# Patient Record
Sex: FEMALE | Race: WHITE | NOT HISPANIC OR LATINO | Employment: FULL TIME | URBAN - METROPOLITAN AREA
[De-identification: names, ages, dates, MRNs, and addresses within clinical notes are randomized per-mention and may not be internally consistent; named-entity substitution may affect disease eponyms.]

---

## 2024-01-05 ENCOUNTER — TRANSFERRED RECORDS (OUTPATIENT)
Dept: HEALTH INFORMATION MANAGEMENT | Facility: CLINIC | Age: 24
End: 2024-01-05

## 2024-01-05 LAB
HBA1C MFR BLD: 5.1 % (ref 4.8–5.6)
PAP-ABSTRACT: ABNORMAL
TSH SERPL-ACNC: 2.73 UIU/ML (ref 0.45–4.5)

## 2024-01-17 ENCOUNTER — TRANSFERRED RECORDS (OUTPATIENT)
Dept: HEALTH INFORMATION MANAGEMENT | Facility: CLINIC | Age: 24
End: 2024-01-17

## 2024-01-20 ENCOUNTER — TRANSFERRED RECORDS (OUTPATIENT)
Dept: MULTI SPECIALTY CLINIC | Facility: CLINIC | Age: 24
End: 2024-01-20

## 2024-01-20 LAB — PAP SMEAR - HIM PATIENT REPORTED: NORMAL

## 2024-06-24 ENCOUNTER — OFFICE VISIT (OUTPATIENT)
Dept: INTERNAL MEDICINE | Facility: CLINIC | Age: 24
End: 2024-06-24
Payer: COMMERCIAL

## 2024-06-24 VITALS
RESPIRATION RATE: 18 BRPM | WEIGHT: 144 LBS | HEIGHT: 62 IN | SYSTOLIC BLOOD PRESSURE: 117 MMHG | DIASTOLIC BLOOD PRESSURE: 78 MMHG | TEMPERATURE: 98 F | HEART RATE: 68 BPM | OXYGEN SATURATION: 99 % | BODY MASS INDEX: 26.5 KG/M2

## 2024-06-24 DIAGNOSIS — G89.29 CHRONIC LEFT-SIDED LOW BACK PAIN WITHOUT SCIATICA: Primary | ICD-10-CM

## 2024-06-24 DIAGNOSIS — M54.50 CHRONIC LEFT-SIDED LOW BACK PAIN WITHOUT SCIATICA: Primary | ICD-10-CM

## 2024-06-24 PROCEDURE — 99203 OFFICE O/P NEW LOW 30 MIN: CPT

## 2024-06-24 RX ORDER — CLASCOTERONE 1 G/100G
CREAM TOPICAL
COMMUNITY

## 2024-06-24 RX ORDER — DOXYCYCLINE HYCLATE 60 MG/1
2 TABLET, DELAYED RELEASE ORAL 2 TIMES DAILY
COMMUNITY

## 2024-06-24 RX ORDER — TRIAMCINOLONE ACETONIDE 1 MG/G
OINTMENT TOPICAL
COMMUNITY

## 2024-06-24 RX ORDER — DEXTROAMPHETAMINE SACCHARATE, AMPHETAMINE ASPARTATE MONOHYDRATE, DEXTROAMPHETAMINE SULFATE AND AMPHETAMINE SULFATE 7.5; 7.5; 7.5; 7.5 MG/1; MG/1; MG/1; MG/1
1 CAPSULE, EXTENDED RELEASE ORAL EVERY MORNING
COMMUNITY

## 2024-06-24 RX ORDER — SULFACETAMIDE SODIUM, SULFUR 98; 48 MG/G; MG/G
LIQUID TOPICAL
COMMUNITY

## 2024-06-24 ASSESSMENT — PAIN SCALES - PAIN ENJOYMENT GENERAL ACTIVITY SCALE (PEG)
INTERFERED_GENERAL_ACTIVITY: 8
AVG_PAIN_PASTWEEK: 5
AVG_PAIN_PASTWEEK: 5
PEG_TOTALSCORE: 5.67
INTERFERED_GENERAL_ACTIVITY: 8
INTERFERED_ENJOYMENT_LIFE: 4
INTERFERED_ENJOYMENT_LIFE: 4
PEG_TOTALSCORE: 5.67

## 2024-06-24 NOTE — PROGRESS NOTES
"  Assessment & Plan     (M54.50,  G89.29) Chronic left-sided low back pain without sciatica  (primary encounter diagnosis)  Comment:   Pt presents for c/o low back pain which began about 3 months ago.  Symptoms are intermittent.  She just graduated college- she used to play club lacrosse. She wonders if she injured herself while lifting weights or while playing lacrosse.  Sitting or bending exacerbates symptoms.  She has stopped exercising over the past few months as this was exacerbating her pain as well.  Denies any bowel or bladders issues, saddle anesthesia, fevers, unintentional weight loss.   Symptoms worsen throughout the day.  No pain to palpation.  Describes symptoms as throbbing and aching.   She occasionally uses Tylenol or ibuprofen to help with symptom management.  I will send referral to physical therapy and she is aware that she should let me know if symptoms do not start to improve at all over the next 48 weeks and I will send referral to orthopedics.  No need for any imaging today.    Plan: Physical Therapy  Referral                  BMI  Estimated body mass index is 26.34 kg/m  as calculated from the following:    Height as of this encounter: 1.575 m (5' 2\").    Weight as of this encounter: 65.3 kg (144 lb).   Weight management plan: Discussed healthy diet and exercise guidelines              Charly Mckeon is a 23 year old, presenting for the following health issues:  Establish Care      Via the Health Maintenance questionnaire, the patient has reported the following services have been completed -Cervical Cancer Screening: [facility] Gynecology 2024-01-20, this information has been sent to the abstraction team.  History of Present Illness       Back Pain:  She presents for follow up of back pain. Patient's back pain is a chronic problem.  Location of back pain:  Left lower back, right side of neck and left side of neck  Description of back pain: gnawing  Back pain spreads: " "nowhere    Since patient first noticed back pain, pain is: always present, but gets better and worse  Does back pain interfere with her job:  Yes       She eats 2-3 servings of fruits and vegetables daily.She consumes 0 sweetened beverage(s) daily.She exercises with enough effort to increase her heart rate 30 to 60 minutes per day.  She exercises with enough effort to increase her heart rate 3 or less days per week.   She is taking medications regularly.                   Objective    /78   Pulse 68   Temp 98  F (36.7  C)   Resp 18   Ht 1.575 m (5' 2\")   Wt 65.3 kg (144 lb)   SpO2 99%   Breastfeeding No   BMI 26.34 kg/m    Body mass index is 26.34 kg/m .  Physical Exam  Constitutional:       General: She is not in acute distress.     Appearance: Normal appearance. She is not ill-appearing, toxic-appearing or diaphoretic.   HENT:      Head: Normocephalic and atraumatic.      Mouth/Throat:      Pharynx: Oropharynx is clear.   Eyes:      Conjunctiva/sclera: Conjunctivae normal.   Pulmonary:      Effort: Pulmonary effort is normal.   Musculoskeletal:      Lumbar back: No swelling, tenderness or bony tenderness.   Skin:     General: Skin is warm and dry.   Neurological:      Mental Status: She is alert and oriented to person, place, and time.   Psychiatric:         Mood and Affect: Mood normal.         Behavior: Behavior normal.         Thought Content: Thought content normal.         Judgment: Judgment normal.                    Signed Electronically by: ALVARO Harden CNP    "

## 2024-06-28 ENCOUNTER — THERAPY VISIT (OUTPATIENT)
Dept: PHYSICAL THERAPY | Facility: CLINIC | Age: 24
End: 2024-06-28
Payer: COMMERCIAL

## 2024-06-28 DIAGNOSIS — G89.29 CHRONIC LEFT-SIDED LOW BACK PAIN WITHOUT SCIATICA: ICD-10-CM

## 2024-06-28 DIAGNOSIS — M54.50 CHRONIC LEFT-SIDED LOW BACK PAIN WITHOUT SCIATICA: ICD-10-CM

## 2024-06-28 PROCEDURE — 97110 THERAPEUTIC EXERCISES: CPT | Mod: GP | Performed by: PHYSICAL THERAPIST

## 2024-06-28 PROCEDURE — 97161 PT EVAL LOW COMPLEX 20 MIN: CPT | Mod: GP | Performed by: PHYSICAL THERAPIST

## 2024-06-28 NOTE — PROGRESS NOTES
PHYSICAL THERAPY EVALUATION  Type of Visit: Evaluation      DOI: 3/1/24  Core power class and lacrosse.  Low back and neck pain-predominantly left side lumbar and upper back both sides  Aggrav: pain with bending, lifting, driving  Allev: avoidance        Subjective       Presenting condition or subjective complaint: Lower back pain on left side and extreme tension in neck and upper back  Date of onset: 03/01/24    Relevant medical history:     Dates & types of surgery: 2015- right ankle growth place surgery    Prior diagnostic imaging/testing results: X-ray     Prior therapy history for the same diagnosis, illness or injury: No          Living Environment  Social support: Alone   Type of home: Apartment/condo   Stairs to enter the home: No       Ramp: No   Stairs inside the home: No       Help at home: None  Equipment owned:       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  [place]  Hobbies/Interests:      Patient goals for therapy: Working out, lifting, sitting for longer periods of time    No shooting pain, parasthesia  No workout classess, walks daily  Pt also reports a recent slip and fall with right RTC sxs that are resolving-saw MD  Lumbar pain is diffuse and at the left lumbar paraspinals-intermittent 5/10 at worst  Thoracic cervical pain is right>left UT and periscap region and occurs with prolonged postures, driving     Objective   Cervical AROM is full, sxs with MARTHA side bending  Obs: pleasant female-flexed sitting posture  TTP: right>left UT, rhomboid-right and lumbar paraspinals left  SLR negative  SI screen clear  MMT; glut max 4/5 MARTHA  Excellent hamstring, gastroc, quad flexibility  Lumbar myotomes 5/5 all levels  Trunk AROM is full with all motions  Pain at endrange flex    L  Assessment & Plan   CLINICAL IMPRESSIONS  Medical Diagnosis: left low back, right neck    Treatment Diagnosis: left low back, right neck   Impression/Assessment: Patient is a 23 year old female with left lumbar  pain/strain, cervical postural strain complaints.  The following significant findings have been identified: Pain, Decreased strength, and Decreased activity tolerance. These impairments interfere with their ability to perform self care tasks, work tasks, and recreational activities as compared to previous level of function.     Clinical Decision Making (Complexity):  Clinical Presentation: Stable/Uncomplicated  Clinical Presentation Rationale: based on medical and personal factors listed in PT evaluation  Clinical Decision Making (Complexity): Low complexity    PLAN OF CARE  Treatment Interventions:  Interventions: Manual Therapy, Neuromuscular Re-education, Therapeutic Activity, Therapeutic Exercise    Long Term Goals     PT Goal 1  Goal Identifier: no pain with bending  Rationale: to maximize safety and independence with performance of ADLs and functional tasks  Target Date: 08/23/24      Frequency of Treatment: 1 time a week  Duration of Treatment: 8 times a week    Recommended Referrals to Other Professionals:  none  Education Assessment:   Learner/Method: Patient    Risks and benefits of evaluation/treatment have been explained.   Patient/Family/caregiver agrees with Plan of Care.     Evaluation Time:     PT Eval, Low Complexity Minutes (50932): 20       Signing Clinician: Samuel Dupree, PT

## 2024-07-19 ENCOUNTER — THERAPY VISIT (OUTPATIENT)
Dept: PHYSICAL THERAPY | Facility: CLINIC | Age: 24
End: 2024-07-19
Payer: COMMERCIAL

## 2024-07-19 DIAGNOSIS — M54.50 LOW BACK PAIN: Primary | ICD-10-CM

## 2024-07-19 PROCEDURE — 97110 THERAPEUTIC EXERCISES: CPT | Mod: GP | Performed by: PHYSICAL THERAPIST

## 2024-07-19 PROCEDURE — 97112 NEUROMUSCULAR REEDUCATION: CPT | Mod: GP | Performed by: PHYSICAL THERAPIST

## 2024-08-06 ENCOUNTER — THERAPY VISIT (OUTPATIENT)
Dept: PHYSICAL THERAPY | Facility: CLINIC | Age: 24
End: 2024-08-06
Payer: COMMERCIAL

## 2024-08-06 DIAGNOSIS — M54.50 LOW BACK PAIN: Primary | ICD-10-CM

## 2024-08-06 PROCEDURE — 97112 NEUROMUSCULAR REEDUCATION: CPT | Mod: GP | Performed by: PHYSICAL THERAPIST

## 2024-08-06 PROCEDURE — 97110 THERAPEUTIC EXERCISES: CPT | Mod: GP | Performed by: PHYSICAL THERAPIST

## 2024-08-15 ENCOUNTER — THERAPY VISIT (OUTPATIENT)
Dept: PHYSICAL THERAPY | Facility: CLINIC | Age: 24
End: 2024-08-15
Payer: COMMERCIAL

## 2024-08-15 DIAGNOSIS — M54.50 LOW BACK PAIN: Primary | ICD-10-CM

## 2024-08-15 PROCEDURE — 97110 THERAPEUTIC EXERCISES: CPT | Mod: GP | Performed by: PHYSICAL THERAPIST

## 2024-08-15 PROCEDURE — 97140 MANUAL THERAPY 1/> REGIONS: CPT | Mod: GP | Performed by: PHYSICAL THERAPIST

## 2024-09-06 ENCOUNTER — THERAPY VISIT (OUTPATIENT)
Dept: PHYSICAL THERAPY | Facility: CLINIC | Age: 24
End: 2024-09-06
Payer: COMMERCIAL

## 2024-09-06 DIAGNOSIS — M54.50 LOW BACK PAIN: Primary | ICD-10-CM

## 2024-09-06 PROCEDURE — 97110 THERAPEUTIC EXERCISES: CPT | Mod: GP | Performed by: PHYSICAL THERAPIST

## 2024-09-06 PROCEDURE — 97140 MANUAL THERAPY 1/> REGIONS: CPT | Mod: GP | Performed by: PHYSICAL THERAPIST

## 2024-09-20 ENCOUNTER — THERAPY VISIT (OUTPATIENT)
Dept: PHYSICAL THERAPY | Facility: CLINIC | Age: 24
End: 2024-09-20
Payer: COMMERCIAL

## 2024-09-20 DIAGNOSIS — M54.50 LOW BACK PAIN: Primary | ICD-10-CM

## 2024-09-20 PROCEDURE — 97140 MANUAL THERAPY 1/> REGIONS: CPT | Mod: GP | Performed by: PHYSICAL THERAPIST

## 2024-09-20 PROCEDURE — 97110 THERAPEUTIC EXERCISES: CPT | Mod: GP | Performed by: PHYSICAL THERAPIST

## 2024-09-20 PROCEDURE — 97112 NEUROMUSCULAR REEDUCATION: CPT | Mod: GP | Performed by: PHYSICAL THERAPIST

## 2024-10-25 ENCOUNTER — THERAPY VISIT (OUTPATIENT)
Dept: PHYSICAL THERAPY | Facility: CLINIC | Age: 24
End: 2024-10-25
Payer: COMMERCIAL

## 2024-10-25 DIAGNOSIS — M54.50 LOW BACK PAIN: Primary | ICD-10-CM

## 2024-10-25 PROCEDURE — 97140 MANUAL THERAPY 1/> REGIONS: CPT | Mod: GP | Performed by: PHYSICAL THERAPIST

## 2024-10-25 PROCEDURE — 97112 NEUROMUSCULAR REEDUCATION: CPT | Mod: GP | Performed by: PHYSICAL THERAPIST

## 2024-11-08 ENCOUNTER — THERAPY VISIT (OUTPATIENT)
Dept: PHYSICAL THERAPY | Facility: CLINIC | Age: 24
End: 2024-11-08
Payer: COMMERCIAL

## 2024-11-08 DIAGNOSIS — M54.50 LOW BACK PAIN: Primary | ICD-10-CM

## 2024-11-08 PROCEDURE — 97140 MANUAL THERAPY 1/> REGIONS: CPT | Mod: GP | Performed by: PHYSICAL THERAPIST

## 2024-11-08 PROCEDURE — 97110 THERAPEUTIC EXERCISES: CPT | Mod: GP | Performed by: PHYSICAL THERAPIST

## 2024-12-13 PROBLEM — M54.50 LOW BACK PAIN: Status: RESOLVED | Noted: 2024-06-28 | Resolved: 2024-12-13

## 2025-01-31 ENCOUNTER — OFFICE VISIT (OUTPATIENT)
Dept: OBGYN | Facility: CLINIC | Age: 25
End: 2025-01-31
Payer: COMMERCIAL

## 2025-01-31 VITALS
SYSTOLIC BLOOD PRESSURE: 113 MMHG | DIASTOLIC BLOOD PRESSURE: 77 MMHG | WEIGHT: 143 LBS | BODY MASS INDEX: 26.16 KG/M2 | HEART RATE: 107 BPM

## 2025-01-31 DIAGNOSIS — Z30.019 ENCOUNTER FOR FEMALE BIRTH CONTROL: Primary | ICD-10-CM

## 2025-01-31 LAB — HCG UR QL: NEGATIVE

## 2025-01-31 PROCEDURE — 81025 URINE PREGNANCY TEST: CPT | Performed by: OBSTETRICS & GYNECOLOGY

## 2025-01-31 PROCEDURE — 99203 OFFICE O/P NEW LOW 30 MIN: CPT | Performed by: OBSTETRICS & GYNECOLOGY

## 2025-01-31 ASSESSMENT — PATIENT HEALTH QUESTIONNAIRE - PHQ9
10. IF YOU CHECKED OFF ANY PROBLEMS, HOW DIFFICULT HAVE THESE PROBLEMS MADE IT FOR YOU TO DO YOUR WORK, TAKE CARE OF THINGS AT HOME, OR GET ALONG WITH OTHER PEOPLE: SOMEWHAT DIFFICULT
SUM OF ALL RESPONSES TO PHQ QUESTIONS 1-9: 1
SUM OF ALL RESPONSES TO PHQ QUESTIONS 1-9: 1

## 2025-01-31 NOTE — PROGRESS NOTES
GYN Progress Note     CC: Birth control counseling     HISTORY OF PRESENT ILLNESS:    Linda Enrique is a 24 year old G0 who presents for contraception counseling. She has been using condoms with her partner but would like something with lower failure/pregnancy rates. She did try OCPs in the past but had mood changes and also felt like it make her adderall less effective and her ADHD symptoms worse. She denies any other GYN concerns. No personal or Fhx of VTE, no personal history of migraine headaches.          OB HISTORY:  OB History   No obstetric history on file.            GYN HISTORY:  She is sexually active with one male partner. Reports having normal pap smear in the past but not sure when that was done and no records available      PAST MEDICAL HISTORY:  Past Medical History:   Diagnosis Date    ADHD (attention deficit hyperactivity disorder)             PAST SURGICAL HISTORY:  No past surgical history on file.       CURRENT MEDICATIONS:   Current Outpatient Medications   Medication Sig Dispense Refill    amphetamine-dextroamphetamine (ADDERALL XR) 30 MG 24 hr capsule Take 1 capsule by mouth every morning              ALLERGIES:  Penicillin g         SOCIAL HISTORY:  Social History     Tobacco Use    Smoking status: Never     Passive exposure: Never    Smokeless tobacco: Never   Vaping Use    Vaping status: Never Used            FAMILY HISTORY:  No family history on file.         REVIEW OF SYSTEMS:  See HPI      PHYSICAL EXAMINATION:  VS:/77 (BP Location: Left arm, Patient Position: Sitting, Cuff Size: Adult Regular)   Pulse 107   Wt 64.9 kg (143 lb)   BMI 26.16 kg/m    Body mass index is 26.16 kg/m .    General: Patient alert and oriented, no acute distress  CV: no peripheral edema or cyanosis  Resp: normal respiratory effort and equal lung expansion  Ext: non-tender, no edema          Laboratory values:  Imaging findings:        ASSESSMENT:  Linda Enrique is a 24 year old G0 who presents for  counseling regarding contraception options       PLAN:  (Z30.019) Encounter for female birth control  (primary encounter diagnosis)  The patient and I discussed various contraceptive methods which best meet her personal family planning goals and fit into her lifestyle with the lowest possible risk of complications or side effects. We discussed multiple contraceptive options including OCPs, NuvaRing, Depo Provera, Nexplanon, and both the hormonal and non-hormonal IUDs.     Risks and benefits of each method were discussed in detail. We specifically discussed the increased risk of blood clot/stroke with estrogen containing medications. Questions were answered to the patient's satisfaction and at this time she has opted to proceed with using the IUD (likely Mirena)  for contraception. She does not want to have this placed today but will continue to use condoms consistently until her next visit.     Discussed pain management options, she will take Ibuprofen 600 mg prior to placement and we can also do a cervical block with 1% lidocaine. Plan for GC/CT at the time of IUD insertion along with pap smear given that we do not have any records at this time.     Plan: HCG qualitative urine    Dispo: RTC for IUD insertion     Michelle Wiley MD

## 2025-02-03 ENCOUNTER — TELEPHONE (OUTPATIENT)
Dept: OBGYN | Facility: CLINIC | Age: 25
End: 2025-02-03
Payer: COMMERCIAL

## 2025-02-03 NOTE — TELEPHONE ENCOUNTER
Health Call Center    Phone Message    May a detailed message be left on voicemail: yes     Reason for Call: Other: Patient called stating she was told to call a few days before her IUD Insertion to discuss pain management options. Patient stated she was thinking of the numbing medication but had some questions about it being either pill form or if it would be a shot. Patient also asking if someone can contact her to just go over the options one more time. Please contact patient in regards to this message. Thank you     Action Taken: Other: Women's    Travel Screening: Not Applicable     Date of Service:

## 2025-02-03 NOTE — TELEPHONE ENCOUNTER
2/7/25 - IUD appointment    Patient is feeling very nervous about IUD placement and wanting to review options again for pain management.    Discussed oxycodone for pain, ativan for anxiety/relaxation.  Patient states these make her nervous to take and would rather not take these. Will call back if she changes her mind.    Patient feels most comfortable with cervical block and ibuprofen.  Discussed use of ibuprofen prior to appointment.    Patient also still undecided about Mirena vs Kyleena.  Discussed they are very similar - major difference is going to be size and how long they last for.    Advised she can always discuss once again prior to placement to feel most comfortable with her decision.    Maria Edwards, RN

## 2025-02-07 ENCOUNTER — OFFICE VISIT (OUTPATIENT)
Dept: OBGYN | Facility: CLINIC | Age: 25
End: 2025-02-07
Payer: COMMERCIAL

## 2025-02-07 VITALS
OXYGEN SATURATION: 100 % | SYSTOLIC BLOOD PRESSURE: 125 MMHG | WEIGHT: 140.9 LBS | TEMPERATURE: 98 F | HEART RATE: 112 BPM | BODY MASS INDEX: 25.77 KG/M2 | DIASTOLIC BLOOD PRESSURE: 83 MMHG

## 2025-02-07 DIAGNOSIS — Z12.4 CERVICAL CANCER SCREENING: ICD-10-CM

## 2025-02-07 DIAGNOSIS — Z30.430 ENCOUNTER FOR IUD INSERTION: Primary | ICD-10-CM

## 2025-02-07 DIAGNOSIS — Z11.3 ROUTINE SCREENING FOR STI (SEXUALLY TRANSMITTED INFECTION): ICD-10-CM

## 2025-02-07 LAB — HCG UR QL: NEGATIVE

## 2025-02-07 PROCEDURE — 87491 CHLMYD TRACH DNA AMP PROBE: CPT | Performed by: OBSTETRICS & GYNECOLOGY

## 2025-02-07 PROCEDURE — 58300 INSERT INTRAUTERINE DEVICE: CPT | Performed by: OBSTETRICS & GYNECOLOGY

## 2025-02-07 PROCEDURE — 81025 URINE PREGNANCY TEST: CPT | Performed by: OBSTETRICS & GYNECOLOGY

## 2025-02-07 PROCEDURE — 87591 N.GONORRHOEAE DNA AMP PROB: CPT | Performed by: OBSTETRICS & GYNECOLOGY

## 2025-02-07 ASSESSMENT — ANXIETY QUESTIONNAIRES
6. BECOMING EASILY ANNOYED OR IRRITABLE: NOT AT ALL
1. FEELING NERVOUS, ANXIOUS, OR ON EDGE: MORE THAN HALF THE DAYS
5. BEING SO RESTLESS THAT IT IS HARD TO SIT STILL: NOT AT ALL
GAD7 TOTAL SCORE: 8
IF YOU CHECKED OFF ANY PROBLEMS ON THIS QUESTIONNAIRE, HOW DIFFICULT HAVE THESE PROBLEMS MADE IT FOR YOU TO DO YOUR WORK, TAKE CARE OF THINGS AT HOME, OR GET ALONG WITH OTHER PEOPLE: SOMEWHAT DIFFICULT
GAD7 TOTAL SCORE: 8
2. NOT BEING ABLE TO STOP OR CONTROL WORRYING: MORE THAN HALF THE DAYS
7. FEELING AFRAID AS IF SOMETHING AWFUL MIGHT HAPPEN: NOT AT ALL
3. WORRYING TOO MUCH ABOUT DIFFERENT THINGS: MORE THAN HALF THE DAYS

## 2025-02-07 ASSESSMENT — PATIENT HEALTH QUESTIONNAIRE - PHQ9
5. POOR APPETITE OR OVEREATING: MORE THAN HALF THE DAYS
SUM OF ALL RESPONSES TO PHQ QUESTIONS 1-9: 3

## 2025-02-07 NOTE — PATIENT INSTRUCTIONS
After having an IUD placed it is normal to have spotting and cramping, you can take Ibuprofen or Tylenol according to the instructions on the bottle and use a heating pad to the lower abdomen as needed.     Please avoid placing anything in the vagina (tampons, intercourse, etc) for 72 hours. The progesterone IUD takes 7 days to be effective for pregnancy prevention so please use condoms for back up contraception if you have intercourse before the 7 day brian but the copper (ParaGard IUD) is effective the same day of placement.     Please call [phone] with any severe abdominal pain, heavy vaginal bleeding, fevers or foul smelling vaginal discharge.     You should check your IUD strings in 2 weeks by placing one or two fingers inside the vagina as high up as you can reach, you should be able to feel the IUD strings (which feel like fishing line), if you can't feel your strings or don't feel comfortable checking yourself you can call clinic to schedule an IUD check.

## 2025-02-08 LAB
C TRACH DNA SPEC QL NAA+PROBE: NEGATIVE
N GONORRHOEA DNA SPEC QL NAA+PROBE: NEGATIVE
SPECIMEN TYPE: NORMAL
SPECIMEN TYPE: NORMAL

## 2025-02-12 LAB
BKR LAB AP GYN ADEQUACY: NORMAL
BKR LAB AP GYN INTERPRETATION: NORMAL
BKR LAB AP HPV REFLEX: NO
BKR LAB AP LMP: NORMAL
BKR LAB AP PREVIOUS ABNORMAL: NORMAL
PATH REPORT.COMMENTS IMP SPEC: NORMAL
PATH REPORT.COMMENTS IMP SPEC: NORMAL
PATH REPORT.RELEVANT HX SPEC: NORMAL

## 2025-02-13 PROBLEM — R87.612 PAPANICOLAOU SMEAR OF CERVIX WITH LOW GRADE SQUAMOUS INTRAEPITHELIAL LESION (LGSIL): Status: ACTIVE | Noted: 2023-12-20

## 2025-02-16 ENCOUNTER — HEALTH MAINTENANCE LETTER (OUTPATIENT)
Age: 25
End: 2025-02-16

## 2025-03-03 ENCOUNTER — OFFICE VISIT (OUTPATIENT)
Dept: DERMATOLOGY | Facility: CLINIC | Age: 25
End: 2025-03-03
Payer: COMMERCIAL

## 2025-03-03 DIAGNOSIS — L91.8 INFLAMED ACROCHORDON: ICD-10-CM

## 2025-03-03 DIAGNOSIS — L70.0 ACNE VULGARIS: Primary | ICD-10-CM

## 2025-03-03 RX ORDER — SPIRONOLACTONE 100 MG/1
TABLET, FILM COATED ORAL
Qty: 90 TABLET | Refills: 1 | Status: SHIPPED | OUTPATIENT
Start: 2025-03-03 | End: 2025-03-06

## 2025-03-03 RX ORDER — SULFACETAMIDE SODIUM, SULFUR 98; 48 MG/G; MG/G
LIQUID TOPICAL DAILY
COMMUNITY

## 2025-03-03 RX ORDER — TRETINOIN 0.25 MG/G
CREAM TOPICAL
Qty: 45 G | Refills: 3 | Status: SHIPPED | OUTPATIENT
Start: 2025-03-03

## 2025-03-03 RX ORDER — TRIFAROTENE 50 UG/G
CREAM TOPICAL AT BEDTIME
COMMUNITY

## 2025-03-03 RX ORDER — DOXYCYCLINE HYCLATE 60 MG/1
2 TABLET, DELAYED RELEASE ORAL 2 TIMES DAILY PRN
COMMUNITY

## 2025-03-03 ASSESSMENT — PAIN SCALES - GENERAL: PAINLEVEL_OUTOF10: NO PAIN (0)

## 2025-03-03 NOTE — LETTER
3/3/2025      Linda Enrique  [address]  [place] [address]      Dear Colleague,    Thank you for referring your patient, Linda Enrique, to the [facility]. Please see a copy of my visit note below.    [facility] Dermatology Note  Encounter Date: Mar 3, 2025  Office Visit     Reviewed patients past medical history and pertinent chart review prior to patients visit today.     Dermatology Problem List:  # Acne vulgaris  -Spironolactone 100 mg daily, tretinoin 0.025% cream, sodium sulfacetamide and sulfur cleanser  # Inflamed acrochordon  -s/p cryo  ____________________________________________    Assessment & Plan:     # Acne vulgaris  We reviewed the etiology, pathogenesis, evolutionary course and treatment alternatives.   At this time our plan is as follows:    Morning:  -Wash face with her her sodium sulfacetamide and sulfur cleanser and lukewarm water.  -Moisturize with a gentle facial moisturizer for sensitive skin such as Cetaphil, CeraVe or Vanicream. Apply spf of 30 or more to entire face.    Night:  -Wash face with a gentle soap and lukewarm water.   -Apply pea sized about of tretinoin 0.025% cream to face starting every other night, increasing to nightly as tolerated.   -Moisturize with a gentle facial moisturizer.    In addition:  - Spironolactone 100 mg daily was prescribed today. Patient was instructed to initially take 50 mg daily for 1-2 weeks or until tolerated to help minimize side effects.  Risks and side effects including but not limited to hyperkalemia, orthostatic hypotension, nausea/vomiting, diarrhea, fatigue, dizziness, headaches, menstrual irregularities, breast tenderness, and feminization of a male fetus were reviewed. The patient confirmed she is not pregnant or planing pregnancy.  -Topical treatments should be applied to the entire face and are not indicated for spot treatment.  -Patient should not get pregnant while on  the above medications.  -If serious side effects develop, patient should stop the medication(s) and contact prescribing provider.    Follow-up: 3 months for follow up if not well controlled. 1 year if well controlled on topical therapy only, sooner PRN new concerns    # Inflamed acrochordon x1, left lower eyelid  -The benign nature of the skin lesion(s) was discussed with the patient.  Due to the inflamed and irritated nature of the lesions, treatment is recommended and the patient was agreeable.    -Cryotherapy procedure note, location(s): left lower eyelid. After verbal consent and discussion of risks and benefits including, but not limited to, dyspigmentation/scar, blister, and pain, the lesion(s) was(were) treated with 1-2 mm freeze border for 1-2 cycles with liquid nitrogen. Post cryotherapy instructions were provided.    All risks, benefits and alternatives were discussed with patient.  Patient is in agreement and understands the assessment and plan.  All questions were answered.  Malorie Ray PA-C  [facility] Dermatology  _______________________________________    CC: Acne    HPI:  Ms. Linda Enrique is a(n) 24 year old female who presents today as a new patient for acne. Patient has acne on the face and sometimes on the chest, which has been present since she was 13-14 years old. Patient has tried topicals, OCPs, oral antibiotics such as doxycycline, and accutane for treatment in the past. She was on accutane roughly 5 years ago. She states her acne did not completely clear, and returned 6 months after stopping it.  She tends to break out along her chin, cheeks, and jawline. She is currently on treatment with Aklief, sodium sulfacetamide and sulfur cleanser, and takes oral doxycycline prn during flares. Her Aklief is drying. She also uses Vanicream cleanser/moisturizer.  She has an IUD Kyleena which she had placed 1 month ago.    Patient is otherwise feeling well, without additional skin  concerns.    Physical Exam:  SKIN: Focused examination of the face only was performed per patient request.  - few closed comedones scattered on the forehead  -scattered inflammatory papules and pustules on jawline, cheeks, and chin region  -minimal scattered postinflammatory hyperpigmentation on the chin and jawline  -left lower eyelid, soft, skin colored to brown pedunculated papule    - No other lesions of concern on areas examined.     Medications:  Current Outpatient Medications   Medication Sig Dispense Refill     amphetamine-dextroamphetamine (ADDERALL XR) 30 MG 24 hr capsule Take 1 capsule by mouth every morning       Doxycycline Hyclate (DORYX MPC) 60 MG TBEC Take 2 tablets by mouth 2 times daily as needed.       Sulfacetamide Sodium-Sulfur 9.8-4.8 % LIQD Externally apply topically daily.       Trifarotene (AKLIEF) 0.005 % CREA at bedtime.       Current Facility-Administered Medications   Medication Dose Route Frequency Provider Last Rate Last Admin     levonorgestrel (KYLEENA) 19.5 MG IUD 1 each  1 each Intrauterine Once           Past Medical History:   Patient Active Problem List   Diagnosis     Papanicolaou smear of cervix with low grade squamous intraepithelial lesion (LGSIL)     Past Medical History:   Diagnosis Date     ADHD (attention deficit hyperactivity disorder)        CC Referred Self, MD  No address on file on close of this encounter.       Again, thank you for allowing me to participate in the care of your patient.        Sincerely,        Kim Ray PA-C    Electronically signed

## 2025-03-03 NOTE — PROGRESS NOTES
[facility] Dermatology Note  Encounter Date: Mar 3, 2025  Office Visit     Reviewed patients past medical history and pertinent chart review prior to patients visit today.     Dermatology Problem List:  # Acne vulgaris  -Spironolactone 100 mg daily, tretinoin 0.025% cream, sodium sulfacetamide and sulfur cleanser  # Inflamed acrochordon  -s/p cryo  ____________________________________________    Assessment & Plan:     # Acne vulgaris  We reviewed the etiology, pathogenesis, evolutionary course and treatment alternatives.   At this time our plan is as follows:    Morning:  -Wash face with her her sodium sulfacetamide and sulfur cleanser and lukewarm water.  -Moisturize with a gentle facial moisturizer for sensitive skin such as Cetaphil, CeraVe or Vanicream. Apply spf of 30 or more to entire face.    Night:  -Wash face with a gentle soap and lukewarm water.   -Apply pea sized about of tretinoin 0.025% cream to face starting every other night, increasing to nightly as tolerated.   -Moisturize with a gentle facial moisturizer.    In addition:  - Spironolactone 100 mg daily was prescribed today. Patient was instructed to initially take 50 mg daily for 1-2 weeks or until tolerated to help minimize side effects.  Risks and side effects including but not limited to hyperkalemia, orthostatic hypotension, nausea/vomiting, diarrhea, fatigue, dizziness, headaches, menstrual irregularities, breast tenderness, and feminization of a male fetus were reviewed. The patient confirmed she is not pregnant or planing pregnancy.  -Topical treatments should be applied to the entire face and are not indicated for spot treatment.  -Patient should not get pregnant while on the above medications.  -If serious side effects develop, patient should stop the medication(s) and contact prescribing provider.    Follow-up: 3 months for follow up if not well controlled. 1 year if well controlled on topical therapy only, sooner PRN  new concerns    # Inflamed acrochordon x1, left lower eyelid  -The benign nature of the skin lesion(s) was discussed with the patient.  Due to the inflamed and irritated nature of the lesions, treatment is recommended and the patient was agreeable.    -Cryotherapy procedure note, location(s): left lower eyelid. After verbal consent and discussion of risks and benefits including, but not limited to, dyspigmentation/scar, blister, and pain, the lesion(s) was(were) treated with 1-2 mm freeze border for 1-2 cycles with liquid nitrogen. Post cryotherapy instructions were provided.    All risks, benefits and alternatives were discussed with patient.  Patient is in agreement and understands the assessment and plan.  All questions were answered.  Malorie Ray PA-C  [facility] Dermatology  _______________________________________    CC: Acne    HPI:  Ms. Linda Enrique is a(n) 24 year old female who presents today as a new patient for acne. Patient has acne on the face and sometimes on the chest, which has been present since she was 13-14 years old. Patient has tried topicals, OCPs, oral antibiotics such as doxycycline, and accutane for treatment in the past. She was on accutane roughly 5 years ago. She states her acne did not completely clear, and returned 6 months after stopping it.  She tends to break out along her chin, cheeks, and jawline. She is currently on treatment with Aklief, sodium sulfacetamide and sulfur cleanser, and takes oral doxycycline prn during flares. Her Aklief is drying. She also uses Vanicream cleanser/moisturizer.  She has an IUD Kyleena which she had placed 1 month ago.    Patient is otherwise feeling well, without additional skin concerns.    Physical Exam:  SKIN: Focused examination of the face only was performed per patient request.  - few closed comedones scattered on the forehead  -scattered inflammatory papules and pustules on jawline, cheeks, and chin region  -minimal scattered  postinflammatory hyperpigmentation on the chin and jawline  -left lower eyelid, soft, skin colored to brown pedunculated papule    - No other lesions of concern on areas examined.     Medications:  Current Outpatient Medications   Medication Sig Dispense Refill    amphetamine-dextroamphetamine (ADDERALL XR) 30 MG 24 hr capsule Take 1 capsule by mouth every morning      Doxycycline Hyclate (DORYX MPC) 60 MG TBEC Take 2 tablets by mouth 2 times daily as needed.      Sulfacetamide Sodium-Sulfur 9.8-4.8 % LIQD Externally apply topically daily.      Trifarotene (AKLIEF) 0.005 % CREA at bedtime.       Current Facility-Administered Medications   Medication Dose Route Frequency Provider Last Rate Last Admin    levonorgestrel (KYLEENA) 19.5 MG IUD 1 each  1 each Intrauterine Once           Past Medical History:   Patient Active Problem List   Diagnosis    Papanicolaou smear of cervix with low grade squamous intraepithelial lesion (LGSIL)     Past Medical History:   Diagnosis Date    ADHD (attention deficit hyperactivity disorder)        CC Referred Self, MD  No address on file on close of this encounter.

## 2025-03-03 NOTE — PATIENT INSTRUCTIONS
Acne plan:  Morning:  -Wash face with her her sodium sulfacetamide and sulfur cleanser and lukewarm water.  -Moisturize with a gentle facial moisturizer for sensitive skin such as Cetaphil, CeraVe or Vanicream. Apply spf of 30 or more to entire face.     Night:  -Wash face with a gentle soap and lukewarm water.   -Apply pea sized about of tretinoin 0.025% cream to face starting every other night, increasing to nightly as tolerated.   -Moisturize with a gentle facial moisturizer.     In addition:  - Spironolactone 100 mg daily was prescribed today. Patient was instructed to initially take 50 mg daily for 1-2 weeks or until tolerated to help minimize side effects.  Risks and side effects including but not limited to hyperkalemia, orthostatic hypotension, nausea/vomiting, diarrhea, fatigue, dizziness, headaches, menstrual irregularities, breast tenderness, and feminization of a male fetus were reviewed. The patient confirmed she is not pregnant or planing pregnancy.  -Topical treatments should be applied to the entire face and are not indicated for spot treatment.  -Patient should not get pregnant while on the above medications.  -If serious side effects develop, patient should stop the medication(s) and contact prescribing provider.      Cryotherapy    What is it?  Use of a very cold liquid, such as liquid nitrogen, to freeze and destroy abnormal skin cells that need to be removed    What should I expect?  Tenderness and redness  A small blister that might grow and fill with dark purple blood. There may be crusting.  More than one treatment may be needed if the lesions do not go away.    How do I care for the treated area?  Gently wash the area with your hands when bathing.  Use a thin layer of Vaseline to help with healing. You may use a Band-Aid.   The area should heal within 7-10 days and may leave behind a pink or lighter color.   Do not use an antibiotic or Neosporin ointment.   You may take acetaminophen  (Tylenol) for pain.     Call your doctor if you have:  Severe pain  Signs of infection (warmth, redness, cloudy yellow drainage, and or a bad smell)  Questions or concerns    Who should I call with questions?      [facility]: [phone]      [facility]: [phone]      For urgent needs outside of business hours call the [facility] at [phone] and ask for the dermatology resident on call

## 2025-03-06 RX ORDER — SPIRONOLACTONE 100 MG/1
TABLET, FILM COATED ORAL
Qty: 90 TABLET | Refills: 1 | Status: SHIPPED | OUTPATIENT
Start: 2025-03-06

## 2025-03-23 ENCOUNTER — OFFICE VISIT (OUTPATIENT)
Dept: URGENT CARE | Facility: URGENT CARE | Age: 25
End: 2025-03-23
Payer: COMMERCIAL

## 2025-03-23 VITALS
HEART RATE: 82 BPM | DIASTOLIC BLOOD PRESSURE: 63 MMHG | OXYGEN SATURATION: 98 % | BODY MASS INDEX: 25.21 KG/M2 | HEIGHT: 62 IN | TEMPERATURE: 97.4 F | SYSTOLIC BLOOD PRESSURE: 98 MMHG | WEIGHT: 137 LBS | RESPIRATION RATE: 16 BRPM

## 2025-03-23 DIAGNOSIS — R21 RASH AND NONSPECIFIC SKIN ERUPTION: Primary | ICD-10-CM

## 2025-03-23 LAB
BASOPHILS # BLD AUTO: 0 10E3/UL (ref 0–0.2)
BASOPHILS NFR BLD AUTO: 0 %
DEPRECATED S PYO AG THROAT QL EIA: NEGATIVE
EOSINOPHIL # BLD AUTO: 0 10E3/UL (ref 0–0.7)
EOSINOPHIL NFR BLD AUTO: 0 %
ERYTHROCYTE [DISTWIDTH] IN BLOOD BY AUTOMATED COUNT: 11.8 % (ref 10–15)
HCT VFR BLD AUTO: 38.4 % (ref 35–47)
HGB BLD-MCNC: 12.7 G/DL (ref 11.7–15.7)
IMM GRANULOCYTES # BLD: 0 10E3/UL
IMM GRANULOCYTES NFR BLD: 0 %
LYMPHOCYTES # BLD AUTO: 0.3 10E3/UL (ref 0.8–5.3)
LYMPHOCYTES NFR BLD AUTO: 4 %
MCH RBC QN AUTO: 28.7 PG (ref 26.5–33)
MCHC RBC AUTO-ENTMCNC: 33.1 G/DL (ref 31.5–36.5)
MCV RBC AUTO: 87 FL (ref 78–100)
MONOCYTES # BLD AUTO: 0.1 10E3/UL (ref 0–1.3)
MONOCYTES NFR BLD AUTO: 2 %
NEUTROPHILS # BLD AUTO: 6.8 10E3/UL (ref 1.6–8.3)
NEUTROPHILS NFR BLD AUTO: 94 %
PLATELET # BLD AUTO: 231 10E3/UL (ref 150–450)
RBC # BLD AUTO: 4.42 10E6/UL (ref 3.8–5.2)
WBC # BLD AUTO: 7.2 10E3/UL (ref 4–11)

## 2025-03-23 PROCEDURE — 36415 COLL VENOUS BLD VENIPUNCTURE: CPT | Performed by: FAMILY MEDICINE

## 2025-03-23 PROCEDURE — 99203 OFFICE O/P NEW LOW 30 MIN: CPT | Performed by: FAMILY MEDICINE

## 2025-03-23 PROCEDURE — 87651 STREP A DNA AMP PROBE: CPT | Performed by: FAMILY MEDICINE

## 2025-03-23 PROCEDURE — 85025 COMPLETE CBC W/AUTO DIFF WBC: CPT | Performed by: FAMILY MEDICINE

## 2025-03-23 PROCEDURE — 3078F DIAST BP <80 MM HG: CPT | Performed by: FAMILY MEDICINE

## 2025-03-23 PROCEDURE — 3074F SYST BP LT 130 MM HG: CPT | Performed by: FAMILY MEDICINE

## 2025-03-23 RX ORDER — FLUTICASONE PROPIONATE 50 MCG
1 SPRAY, SUSPENSION (ML) NASAL
COMMUNITY
End: 2025-03-23

## 2025-03-23 RX ORDER — PREDNISONE 20 MG/1
20 TABLET ORAL DAILY
COMMUNITY
Start: 2025-03-23

## 2025-03-23 RX ORDER — HYDROXYZINE HYDROCHLORIDE 25 MG/1
TABLET, FILM COATED ORAL
COMMUNITY

## 2025-03-23 ASSESSMENT — ENCOUNTER SYMPTOMS: FEVER: 0

## 2025-03-24 ENCOUNTER — OFFICE VISIT (OUTPATIENT)
Dept: URGENT CARE | Facility: URGENT CARE | Age: 25
End: 2025-03-24
Payer: COMMERCIAL

## 2025-03-24 VITALS
DIASTOLIC BLOOD PRESSURE: 58 MMHG | TEMPERATURE: 98.4 F | WEIGHT: 138 LBS | BODY MASS INDEX: 25.24 KG/M2 | HEART RATE: 81 BPM | SYSTOLIC BLOOD PRESSURE: 89 MMHG | OXYGEN SATURATION: 100 %

## 2025-03-24 DIAGNOSIS — Z20.818 STREPTOCOCCUS EXPOSURE: Primary | ICD-10-CM

## 2025-03-24 LAB — S PYO DNA THROAT QL NAA+PROBE: NOT DETECTED

## 2025-03-24 PROCEDURE — 3078F DIAST BP <80 MM HG: CPT | Performed by: PHYSICIAN ASSISTANT

## 2025-03-24 PROCEDURE — 3074F SYST BP LT 130 MM HG: CPT | Performed by: PHYSICIAN ASSISTANT

## 2025-03-24 PROCEDURE — 99213 OFFICE O/P EST LOW 20 MIN: CPT | Performed by: PHYSICIAN ASSISTANT

## 2025-03-24 RX ORDER — AMOXICILLIN 500 MG/1
500 CAPSULE ORAL 2 TIMES DAILY
Qty: 20 CAPSULE | Refills: 0 | Status: SHIPPED | OUTPATIENT
Start: 2025-03-24 | End: 2025-04-03

## 2025-03-24 NOTE — PROGRESS NOTES
Assessment & Plan     Rash and nonspecific skin eruption  Likely a viral exanthem, recent had some URI symptoms and now presenting with a diffuse rash.  It is blanchable, do not suspect vasculitis.  With throat symptoms, I did examine the oropharynx, negative strep, no imminent airway compromise.  CBC do not show any major systemic infectious process.  At this time patient will purchase over-the-counter Zyrtec 10 mg take twice daily, reviewed precautions for going to the ER including symptoms of throat swelling, difficulty breathing, at that time recommend calling 911 or going to the ED.  Otherwise, okay to continue prednisone.  May need dermatology consultation in future if not improving.  - Streptococcus A Rapid Screen w/Reflex to PCR - Clinic Collect  - CBC with platelets and differential  - CBC with platelets and differential  - Group A Streptococcus PCR Throat Swab             Return in about 1 week (around 3/30/2025) for If symptoms do not improve or gets worse..    Fredrick Vinson MD  [facility] [place]    Charly Mckeon is a 24 year old female who presents to clinic today for the following health issues:  Chief Complaint   Patient presents with    Derm Problem     Rash on entire body, noticed this AM---had virtual visit this AM and was prescribed Prednisone         3/23/2025     7:08 PM   Additional Questions   Roomed by Pepper Perez     HPI    Patient is a pleasant 24-year-old female who presents urgent care with entire body rash, started this morning, mostly start on the right upper thigh and now has spread throughout her entire body and onto her hands and legs.  Very itchy, did a telehealth visit today was prescribed prednisone and she believes symptoms of gotten worse.  She is getting some tingling sensations in her lips.  Having some throat discomfort, no difficulty swallowing or breathing.  Preceding this episode she had a week of upper respiratory infectious symptoms,  "those have resolved and now she has a rash.    Denies abdominal pain, diarrhea, lower urinary tract symptoms including painful urination increased urinary frequency.  No vaginal discharge.    No new pets, recent travel, change of medication, soaps, laundry detergents, injury or insect bite.          Review of Systems   Constitutional:  Negative for fever.           Objective    BP 98/63 (BP Location: Right arm, Patient Position: Chair, Cuff Size: Adult Regular)   Pulse 82   Temp 97.4  F (36.3  C) (Temporal)   Resp 16   Ht 1.575 m (5' 2\")   Wt 62.1 kg (137 lb)   LMP 02/03/2025   SpO2 98%   BMI 25.06 kg/m    Physical Exam  Constitutional:       Appearance: Normal appearance. She is not ill-appearing.   HENT:      Mouth/Throat:      Pharynx: No oropharyngeal exudate or posterior oropharyngeal erythema.      Comments: Uvula is midline.  Cardiovascular:      Rate and Rhythm: Normal rate and regular rhythm.   Pulmonary:      Effort: Pulmonary effort is normal.      Breath sounds: Normal breath sounds.   Abdominal:      Palpations: Abdomen is soft.   Skin:     Comments: [place] erythematous macules, blanchable, nontender.  No increased warmth throughout her entire body.            Results for orders placed or performed in visit on 03/23/25 (from the past 24 hours)   Streptococcus A Rapid Screen w/Reflex to PCR - Clinic Collect    Specimen: Throat; Swab   Result Value Ref Range    Group A Strep antigen Negative Negative   CBC with platelets and differential    Narrative    The following orders were created for panel order CBC with platelets and differential.  Procedure                               Abnormality         Status                     ---------                               -----------         ------                     CBC with platelets and ...[[id number]]  Abnormal            Final result                 Please view results for these tests on the individual orders.   CBC with platelets and " differential   Result Value Ref Range    WBC Count 7.2 4.0 - 11.0 10e3/uL    RBC Count 4.42 3.80 - 5.20 10e6/uL    Hemoglobin 12.7 11.7 - 15.7 g/dL    Hematocrit 38.4 35.0 - 47.0 %    MCV 87 78 - 100 fL    MCH 28.7 26.5 - 33.0 pg    MCHC 33.1 31.5 - 36.5 g/dL    RDW 11.8 10.0 - 15.0 %    Platelet Count 231 150 - 450 10e3/uL    % Neutrophils 94 %    % Lymphocytes 4 %    % Monocytes 2 %    % Eosinophils 0 %    % Basophils 0 %    % Immature Granulocytes 0 %    Absolute Neutrophils 6.8 1.6 - 8.3 10e3/uL    Absolute Lymphocytes 0.3 (L) 0.8 - 5.3 10e3/uL    Absolute Monocytes 0.1 0.0 - 1.3 10e3/uL    Absolute Eosinophils 0.0 0.0 - 0.7 10e3/uL    Absolute Basophils 0.0 0.0 - 0.2 10e3/uL    Absolute Immature Granulocytes 0.0 <=0.4 10e3/uL

## 2025-03-24 NOTE — PATIENT INSTRUCTIONS
Zyrtec 10 mg, twice a day for a week. If you're getting worse, having sensation of throat closing up or difficult breathing please go to the ER or call 9-11.

## 2025-03-24 NOTE — PROGRESS NOTES
SUBJECTIVE:  Linda Enrique is a 24 year old female who presents to the clinic today for a rash.  Onset of rash was 2 day(s) ago.   Rash is still present.  Location of the rash: generalized.  Quality/symptoms of rash: itching and red   Symptoms are mild and rash seems to be improving.  Previous history of a similar rash? No  Recent exposure history: exposed to strep and mono    Associated symptoms include: nothing.    Past Medical History:   Diagnosis Date    ADHD (attention deficit hyperactivity disorder)      Current Outpatient Medications   Medication Sig Dispense Refill    amoxicillin (AMOXIL) 500 MG capsule Take 1 capsule (500 mg) by mouth 2 times daily for 10 days. 20 capsule 0    amphetamine-dextroamphetamine (ADDERALL XR) 30 MG 24 hr capsule Take 1 capsule by mouth every morning      predniSONE (DELTASONE) 20 MG tablet Take 20 mg by mouth daily.      spironolactone (ALDACTONE) 100 MG tablet Take 1/2 tablet (50 mg) daily for 1-2 weeks, THEN IF TOLERATED, increase to 1 tablet (100 mg) daily thereafter. 90 tablet 1    hydrOXYzine HCl (ATARAX) 25 MG tablet  (Patient not taking: Reported on 3/24/2025)      Sulfacetamide Sodium-Sulfur 9.8-4.8 % LIQD Externally apply topically daily. (Patient not taking: Reported on 3/24/2025)      tretinoin (RETIN-A) 0.025 % external cream Use pea sized amount nightly to face. (Patient not taking: Reported on 3/24/2025) 45 g 3    Trifarotene (AKLIEF) 0.005 % CREA at bedtime. (Patient not taking: Reported on 3/24/2025)       Social History     Tobacco Use    Smoking status: Never     Passive exposure: Never    Smokeless tobacco: Never   Substance Use Topics    Alcohol use: Not on file       ROS:  Review of systems negative except as stated above.    EXAM:   BP 89/58 (BP Location: Right arm, Patient Position: Sitting, Cuff Size: Adult Regular)   Pulse 81   Temp 98.4  F (36.9  C) (Temporal)   Wt 62.6 kg (138 lb)   LMP 02/03/2025   SpO2 100%   BMI 25.24 kg/m    GENERAL: alert,  no acute distress.  SKIN: macular generalized  GENERAL APPEARANCE: healthy, alert and no distress  HENT: ear canals and TM's normal.  Nose and mouth without ulcers, erythema or lesions  NECK: supple, non-tender to palpation, no adenopathy noted  RESP: lungs clear to auscultation - no rales, rhonchi or wheezes  CV: regular rates and rhythm, normal S1 S2, no murmur noted    ASSESSMENT:  (Z20.818) Streptococcus exposure  (primary encounter diagnosis)  Plan: amoxicillin (AMOXIL) 500 MG capsule           Follow up with PCP if symptoms worsen or fail to improve

## 2025-04-08 ENCOUNTER — HOSPITAL ENCOUNTER (EMERGENCY)
Facility: CLINIC | Age: 25
Discharge: HOME | End: 2025-04-09
Attending: EMERGENCY MEDICINE | Admitting: EMERGENCY MEDICINE
Payer: COMMERCIAL

## 2025-04-08 VITALS
DIASTOLIC BLOOD PRESSURE: 76 MMHG | WEIGHT: 135 LBS | HEIGHT: 62 IN | HEART RATE: 84 BPM | BODY MASS INDEX: 24.84 KG/M2 | RESPIRATION RATE: 16 BRPM | SYSTOLIC BLOOD PRESSURE: 120 MMHG | TEMPERATURE: 98.4 F | OXYGEN SATURATION: 100 %

## 2025-04-08 DIAGNOSIS — R10.9 ABDOMINAL PAIN, UNSPECIFIED ABDOMINAL LOCATION: ICD-10-CM

## 2025-04-08 LAB
ALBUMIN UR-MCNC: NEGATIVE MG/DL
APPEARANCE UR: CLEAR
BACTERIA #/AREA URNS HPF: ABNORMAL /HPF
BILIRUB UR QL STRIP: NEGATIVE
COLOR UR AUTO: ABNORMAL
GLUCOSE UR STRIP-MCNC: NEGATIVE MG/DL
HGB UR QL STRIP: NEGATIVE
KETONES UR STRIP-MCNC: NEGATIVE MG/DL
LEUKOCYTE ESTERASE UR QL STRIP: NEGATIVE
MUCOUS THREADS #/AREA URNS LPF: PRESENT /LPF
NITRATE UR QL: NEGATIVE
PH UR STRIP: 5.5 [PH] (ref 5–7)
RBC URINE: 0 /HPF
SP GR UR STRIP: 1.02 (ref 1–1.03)
SQUAMOUS EPITHELIAL: 2 /HPF
UROBILINOGEN UR STRIP-MCNC: NORMAL MG/DL
WBC URINE: 1 /HPF

## 2025-04-08 PROCEDURE — 99283 EMERGENCY DEPT VISIT LOW MDM: CPT | Performed by: EMERGENCY MEDICINE

## 2025-04-08 PROCEDURE — 81001 URINALYSIS AUTO W/SCOPE: CPT | Performed by: EMERGENCY MEDICINE

## 2025-04-08 PROCEDURE — 99284 EMERGENCY DEPT VISIT MOD MDM: CPT | Performed by: EMERGENCY MEDICINE

## 2025-04-08 ASSESSMENT — COLUMBIA-SUICIDE SEVERITY RATING SCALE - C-SSRS
6. HAVE YOU EVER DONE ANYTHING, STARTED TO DO ANYTHING, OR PREPARED TO DO ANYTHING TO END YOUR LIFE?: NO
2. HAVE YOU ACTUALLY HAD ANY THOUGHTS OF KILLING YOURSELF IN THE PAST MONTH?: NO
1. IN THE PAST MONTH, HAVE YOU WISHED YOU WERE DEAD OR WISHED YOU COULD GO TO SLEEP AND NOT WAKE UP?: NO

## 2025-04-09 LAB
ALBUMIN SERPL BCG-MCNC: 4.2 G/DL (ref 3.5–5.2)
ALP SERPL-CCNC: 51 U/L (ref 40–150)
ALT SERPL W P-5'-P-CCNC: 28 U/L (ref 0–50)
ANION GAP SERPL CALCULATED.3IONS-SCNC: 10 MMOL/L (ref 7–15)
AST SERPL W P-5'-P-CCNC: 17 U/L (ref 0–45)
BASOPHILS # BLD AUTO: 0.1 10E3/UL (ref 0–0.2)
BASOPHILS NFR BLD AUTO: 1 %
BILIRUB SERPL-MCNC: 0.4 MG/DL
BUN SERPL-MCNC: 22.2 MG/DL (ref 6–20)
CALCIUM SERPL-MCNC: 9.2 MG/DL (ref 8.8–10.4)
CHLORIDE SERPL-SCNC: 102 MMOL/L (ref 98–107)
CREAT SERPL-MCNC: 0.71 MG/DL (ref 0.51–0.95)
EGFRCR SERPLBLD CKD-EPI 2021: >90 ML/MIN/1.73M2
EOSINOPHIL # BLD AUTO: 0.1 10E3/UL (ref 0–0.7)
EOSINOPHIL NFR BLD AUTO: 1 %
ERYTHROCYTE [DISTWIDTH] IN BLOOD BY AUTOMATED COUNT: 11.9 % (ref 10–15)
GLUCOSE SERPL-MCNC: 93 MG/DL (ref 70–99)
HCG SERPL QL: NEGATIVE
HCO3 SERPL-SCNC: 23 MMOL/L (ref 22–29)
HCT VFR BLD AUTO: 38.9 % (ref 35–47)
HGB BLD-MCNC: 13.1 G/DL (ref 11.7–15.7)
IMM GRANULOCYTES # BLD: 0 10E3/UL
IMM GRANULOCYTES NFR BLD: 0 %
LIPASE SERPL-CCNC: 58 U/L (ref 13–60)
LYMPHOCYTES # BLD AUTO: 2.5 10E3/UL (ref 0.8–5.3)
LYMPHOCYTES NFR BLD AUTO: 17 %
MCH RBC QN AUTO: 29 PG (ref 26.5–33)
MCHC RBC AUTO-ENTMCNC: 33.7 G/DL (ref 31.5–36.5)
MCV RBC AUTO: 86 FL (ref 78–100)
MONOCYTES # BLD AUTO: 0.9 10E3/UL (ref 0–1.3)
MONOCYTES NFR BLD AUTO: 6 %
NEUTROPHILS # BLD AUTO: 11 10E3/UL (ref 1.6–8.3)
NEUTROPHILS NFR BLD AUTO: 75 %
NRBC # BLD AUTO: 0 10E3/UL
NRBC BLD AUTO-RTO: 0 /100
PLATELET # BLD AUTO: 319 10E3/UL (ref 150–450)
POTASSIUM SERPL-SCNC: 4 MMOL/L (ref 3.4–5.3)
PROT SERPL-MCNC: 7.5 G/DL (ref 6.4–8.3)
RBC # BLD AUTO: 4.51 10E6/UL (ref 3.8–5.2)
SODIUM SERPL-SCNC: 135 MMOL/L (ref 135–145)
WBC # BLD AUTO: 14.7 10E3/UL (ref 4–11)

## 2025-04-09 PROCEDURE — 84703 CHORIONIC GONADOTROPIN ASSAY: CPT | Performed by: EMERGENCY MEDICINE

## 2025-04-09 PROCEDURE — 36415 COLL VENOUS BLD VENIPUNCTURE: CPT | Performed by: EMERGENCY MEDICINE

## 2025-04-09 PROCEDURE — 85025 COMPLETE CBC W/AUTO DIFF WBC: CPT | Performed by: EMERGENCY MEDICINE

## 2025-04-09 PROCEDURE — 80053 COMPREHEN METABOLIC PANEL: CPT | Performed by: EMERGENCY MEDICINE

## 2025-04-09 PROCEDURE — 83690 ASSAY OF LIPASE: CPT | Performed by: EMERGENCY MEDICINE

## 2025-04-09 ASSESSMENT — ACTIVITIES OF DAILY LIVING (ADL): ADLS_ACUITY_SCORE: 41

## 2025-04-09 NOTE — ED PROVIDER NOTES
"ED Provider Note  [facility]      History     Chief Complaint   Patient presents with    Abdominal Pain     Sudden lowe abdominal pain within the last hr.     Rectal Bleeding     Noted rectal bleed in the last hr. No issue with BM.      HPI  Linda Enrique is a 24 year old female who presents to the emergency department for rectal bleeding and abdominal pain. The patient states that after she ate dinner she developed a stomach ache. She felt the urge to make a bowel movement. She then developed diarrhea. She later had a  small amount of blood with the diarrhea. After having a bowel moment her abdominal pain turned to cramps. Her pain has been decreasing since. She was recently on Amoxicillin and Prednisone as she was exposed to Mono and strep. She finished these a week ago. She did get an IUD placed 2 months ago. She denies any abdominal surgery's. No vaginal bleeding or urinary complaints. No fevers.     Past Medical History  Past Medical History:   Diagnosis Date    ADHD (attention deficit hyperactivity disorder)      No past surgical history on file.  amphetamine-dextroamphetamine (ADDERALL XR) 30 MG 24 hr capsule  spironolactone (ALDACTONE) 100 MG tablet  tretinoin (RETIN-A) 0.025 % external cream  hydrOXYzine HCl (ATARAX) 25 MG tablet  predniSONE (DELTASONE) 20 MG tablet  Sulfacetamide Sodium-Sulfur 9.8-4.8 % LIQD  Trifarotene (AKLIEF) 0.005 % CREA      Allergies   Allergen Reactions    Penicillin G Unknown     Family History  No family history on file.  Social History   Social History     Tobacco Use    Smoking status: Never     Passive exposure: Never    Smokeless tobacco: Never   Vaping Use    Vaping status: Never Used      A medically appropriate review of systems was performed with pertinent positives and negatives noted in the HPI, and all other systems negative.    Physical Exam   BP: 120/76  Pulse: 84  Temp: 98.4  F (36.9  C)  Resp: 16  Height: 157.5 cm (5' 2\")  Weight: 61.2 " kg (135 lb)  SpO2: 100 %  Physical Exam  Exam conducted with a chaperone present.   Constitutional:       General: She is not in acute distress.     Appearance: She is not toxic-appearing.   Cardiovascular:      Rate and Rhythm: Normal rate and regular rhythm.   Pulmonary:      Effort: Pulmonary effort is normal. No respiratory distress.      Breath sounds: No wheezing.   Abdominal:      General: There is no distension.      Palpations: Abdomen is soft.      Tenderness: There is no abdominal tenderness. There is no guarding.   Genitourinary:     Rectum: No tenderness, anal fissure or external hemorrhoid.   Musculoskeletal:         General: Normal range of motion.   Skin:     General: Skin is warm and dry.   Neurological:      General: No focal deficit present.      Mental Status: She is alert and oriented to person, place, and time.           ED Course, Procedures, & Data      Procedures                Results for orders placed or performed during the hospital encounter of 04/08/25   UA with Microscopic reflex to Culture     Status: Abnormal    Specimen: Urine, Clean Catch   Result Value Ref Range    Color Urine Light Yellow Colorless, Straw, Light Yellow, Yellow    Appearance Urine Clear Clear    Glucose Urine Negative Negative mg/dL    Bilirubin Urine Negative Negative    Ketones Urine Negative Negative mg/dL    Specific Gravity Urine 1.022 1.003 - 1.035    Blood Urine Negative Negative    pH Urine 5.5 5.0 - 7.0    Protein Albumin Urine Negative Negative mg/dL    Urobilinogen Urine Normal Normal mg/dL    Nitrite Urine Negative Negative    Leukocyte Esterase Urine Negative Negative    Bacteria Urine Many (A) None Seen /HPF    Mucus Urine Present (A) None Seen /LPF    RBC Urine 0 <=2 /HPF    WBC Urine 1 <=5 /HPF    Squamous Epithelials Urine 2 (H) <=1 /HPF    Narrative    Urine Culture not indicated   Comprehensive metabolic panel     Status: Abnormal   Result Value Ref Range    Sodium 135 135 - 145 mmol/L     Potassium 4.0 3.4 - 5.3 mmol/L    Carbon Dioxide (CO2) 23 22 - 29 mmol/L    Anion Gap 10 7 - 15 mmol/L    Urea Nitrogen 22.2 (H) 6.0 - 20.0 mg/dL    Creatinine 0.71 0.51 - 0.95 mg/dL    GFR Estimate >90 >60 mL/min/1.73m2    Calcium 9.2 8.8 - 10.4 mg/dL    Chloride 102 98 - 107 mmol/L    Glucose 93 70 - 99 mg/dL    Alkaline Phosphatase 51 40 - 150 U/L    AST 17 0 - 45 U/L    ALT 28 0 - 50 U/L    Protein Total 7.5 6.4 - 8.3 g/dL    Albumin 4.2 3.5 - 5.2 g/dL    Bilirubin Total 0.4 <=1.2 mg/dL   Lipase     Status: Normal   Result Value Ref Range    Lipase 58 13 - 60 U/L   HCG qualitative pregnancy (blood)     Status: Normal   Result Value Ref Range    hCG Serum Qualitative Negative Negative   CBC with platelets and differential     Status: Abnormal   Result Value Ref Range    WBC Count 14.7 (H) 4.0 - 11.0 10e3/uL    RBC Count 4.51 3.80 - 5.20 10e6/uL    Hemoglobin 13.1 11.7 - 15.7 g/dL    Hematocrit 38.9 35.0 - 47.0 %    MCV 86 78 - 100 fL    MCH 29.0 26.5 - 33.0 pg    MCHC 33.7 31.5 - 36.5 g/dL    RDW 11.9 10.0 - 15.0 %    Platelet Count 319 150 - 450 10e3/uL    % Neutrophils 75 %    % Lymphocytes 17 %    % Monocytes 6 %    % Eosinophils 1 %    % Basophils 1 %    % Immature Granulocytes 0 %    NRBCs per 100 WBC 0 <1 /100    Absolute Neutrophils 11.0 (H) 1.6 - 8.3 10e3/uL    Absolute Lymphocytes 2.5 0.8 - 5.3 10e3/uL    Absolute Monocytes 0.9 0.0 - 1.3 10e3/uL    Absolute Eosinophils 0.1 0.0 - 0.7 10e3/uL    Absolute Basophils 0.1 0.0 - 0.2 10e3/uL    Absolute Immature Granulocytes 0.0 <=0.4 10e3/uL    Absolute NRBCs 0.0 10e3/uL   CBC with platelets differential     Status: Abnormal    Narrative    The following orders were created for panel order CBC with platelets differential.  Procedure                               Abnormality         Status                     ---------                               -----------         ------                     CBC with platelets and ...[[id number]]  Abnormal            Final  result                 Please view results for these tests on the individual orders.     Medications - No data to display  Labs Ordered and Resulted from Time of ED Arrival to Time of ED Departure   ROUTINE UA WITH MICROSCOPIC REFLEX TO CULTURE - Abnormal       Result Value    Color Urine Light Yellow      Appearance Urine Clear      Glucose Urine Negative      Bilirubin Urine Negative      Ketones Urine Negative      Specific Gravity Urine 1.022      Blood Urine Negative      pH Urine 5.5      Protein Albumin Urine Negative      Urobilinogen Urine Normal      Nitrite Urine Negative      Leukocyte Esterase Urine Negative      Bacteria Urine Many (*)     Mucus Urine Present (*)     RBC Urine 0      WBC Urine 1      Squamous Epithelials Urine 2 (*)    COMPREHENSIVE METABOLIC PANEL - Abnormal    Sodium 135      Potassium 4.0      Carbon Dioxide (CO2) 23      Anion Gap 10      Urea Nitrogen 22.2 (*)     Creatinine 0.71      GFR Estimate >90      Calcium 9.2      Chloride 102      Glucose 93      Alkaline Phosphatase 51      AST 17      ALT 28      Protein Total 7.5      Albumin 4.2      Bilirubin Total 0.4     CBC WITH PLATELETS AND DIFFERENTIAL - Abnormal    WBC Count 14.7 (*)     RBC Count 4.51      Hemoglobin 13.1      Hematocrit 38.9      MCV 86      MCH 29.0      MCHC 33.7      RDW 11.9      Platelet Count 319      % Neutrophils 75      % Lymphocytes 17      % Monocytes 6      % Eosinophils 1      % Basophils 1      % Immature Granulocytes 0      NRBCs per 100 WBC 0      Absolute Neutrophils 11.0 (*)     Absolute Lymphocytes 2.5      Absolute Monocytes 0.9      Absolute Eosinophils 0.1      Absolute Basophils 0.1      Absolute Immature Granulocytes 0.0      Absolute NRBCs 0.0     LIPASE - Normal    Lipase 58     HCG QUALITATIVE PREGNANCY - Normal    hCG Serum Qualitative Negative       No orders to display          Critical care was not performed.     Medical Decision Making  The patient's presentation was of  moderate complexity (an undiagnosed new problem with uncertain prognosis).    The patient's evaluation involved:  strong consideration of a test (see separate area of note for details) that was ultimately deferred  ordering and/or review of 3+ test(s) in this encounter (see separate area of note for details)    The patient's management necessitated only low risk treatment.    Assessment & Plan    This is a 24-year-old female presenting with abdominal pain, diarrhea, blood in stool.  On arrival vitals were reassuring, she was well-appearing.  She noted the pain has been decreasing since onset.  Her abdominal exam is reassuring, she does not have any abdominal tenderness.  Labs were obtained, did not show anemia.  There was slight leukocytosis on her CBC.  The rest of her labs were otherwise reassuring, lipase was normal.  Urinalysis not consistent with a UTI.    I discussed these results with the patient, reevaluated her.  She continues to note that pain was overall improved compared to the time of onset, she remained nontender on exam.  Symptoms may be related to potential foodborne illness as she symptoms began shortly after having oral intake.  Would also consider other viral gastroenteritis/gastritis.  Overall lower suspicion for other acute intra-abdominal process.  The blood cell count could be reactive related to her acute illness. I did consider doing a CT scan and discussed this with the patient.  Given her reassuring serial abdominal exams, vital signs, and the patient's engagement in shared decision making a abdominal CT was deferred at this time.  She will continue to monitor symptoms and if they worsen, return, she develops fevers, or other symptoms she will return to the ED for reevaluation.  She was stable for discharge.  Return precautions were discussed and all questions answered.    I have reviewed the nursing notes. I have reviewed the findings, diagnosis, plan and need for follow up with the  patient.    New Prescriptions    No medications on file       Final diagnoses:   Abdominal pain, unspecified abdominal location   IJolanta, am serving as a trained medical scribe to document services personally performed by Zaid Delgado MD, based on the provider's statements to me.     Zaid BARRAGAN MD, was physically present and have reviewed and verified the accuracy of this note documented by Jolanta Kim.     Zaid Delgado MD  [facility] EMERGENCY DEPARTMENT  4/8/2025     Zaid Delgado MD  04/09/25 0143

## 2025-04-09 NOTE — ED NOTES
Patient's After Visit Summary was reviewed with patient.  Patient verbalized understanding of After Visit Summary, recommended follow up and was given an opportunity to ask questions.   Discharge medications sent home with patient/family:  No

## 2025-06-02 ENCOUNTER — OFFICE VISIT (OUTPATIENT)
Dept: DERMATOLOGY | Facility: CLINIC | Age: 25
End: 2025-06-02
Payer: COMMERCIAL

## 2025-06-02 DIAGNOSIS — L70.0 ACNE VULGARIS: Primary | ICD-10-CM

## 2025-06-02 PROCEDURE — 99213 OFFICE O/P EST LOW 20 MIN: CPT | Performed by: STUDENT IN AN ORGANIZED HEALTH CARE EDUCATION/TRAINING PROGRAM

## 2025-06-02 RX ORDER — SPIRONOLACTONE 50 MG/1
50 TABLET, FILM COATED ORAL DAILY
Qty: 90 TABLET | Refills: 3 | Status: SHIPPED | OUTPATIENT
Start: 2025-06-02

## 2025-06-02 NOTE — LETTER
6/2/2025      Linda Enrqiue  [address]  [facility] [address]      Dear Colleague,    Thank you for referring your patient, Linda Enrique, to the [facility]. Please see a copy of my visit note below.    [facility] Dermatology Note  Encounter Date: Jun 2, 2025  Office Visit     Reviewed patients past medical history and pertinent chart review prior to patients visit today.     Dermatology Problem List:  # Acne vulgaris  -Spironolactone 50 mg daily and sodium sulfacetamide and sulfur cleanser  -Prior tx: Tretinoin 0.025% cream (experienced dryness), spironolactone 100 mg daily (experienced menstrual irregularity)  # Inflamed acrochordon  -s/p cryo  ____________________________________________    Assessment & Plan:     # Acne vulgaris, chronic, stable  - Her skin is clear today. Due to menstrual irregularity and dryness, we will lower dose to spironolactone 50 mg daily. Risks and side effects including but not limited to hyperkalemia, orthostatic hypotension, dizziness, headaches, menstrual irregularities, breast tenderness, and feminization of a male fetus were reviewed. Patient should not get pregnant while on the above medications.  -Discontinue tretinoin.  -Continue sodium sulfacetamide and sulfur cleanser daily.    Follow-up: 3 months for follow up if not well controlled. 1 year if well controlled, sooner PRN new concerns    All risks, benefits and alternatives were discussed with patient.  Patient is in agreement and understands the assessment and plan.  All questions were answered.  Malorie Ray PA-C  [facility] Dermatology  _______________________________________    CC: Acne    HPI:  Ms. Linda Enrique is a(n) 24 year old female who presents today as a return patient for acne.  She was last seen in dermatology on 3/3/2025 for acne at which time she was started on spironolactone 100 mg daily. She was also continued on tretinoin  0.025% cream, and sodium sulfacetamide and sulfur cleanser. Additionally, an inflamed acrochordon of the left lower eyelid was treated with cryotherapy.    Today, she presents for follow up and feels her acne has improved. She is no longer experiencing the cystic acne she was previously. However, her skin is dry and she has stopped using the tretinoin due to this. She feels dry all over. She also mentions that her menstrual cycle has become more irregular with the medication. She feels this is somewhat improved, but is not yet back to her baseline.  She continues to use her sodium sulfacetamide and sulfur cleanser daily.    Patient is otherwise feeling well, without additional skin concerns.    Physical Exam:  SKIN: Focused examination of the face only was performed per patient request.  -Skin is clear, no acneiform lesions on exam    - No other lesions of concern on areas examined.     Medications:  Current Outpatient Medications   Medication Sig Dispense Refill     amphetamine-dextroamphetamine (ADDERALL XR) 30 MG 24 hr capsule Take 1 capsule by mouth every morning       hydrOXYzine HCl (ATARAX) 25 MG tablet  (Patient not taking: Reported on 3/23/2025)       predniSONE (DELTASONE) 20 MG tablet Take 20 mg by mouth daily.       spironolactone (ALDACTONE) 100 MG tablet Take 1/2 tablet (50 mg) daily for 1-2 weeks, THEN IF TOLERATED, increase to 1 tablet (100 mg) daily thereafter. 90 tablet 1     Sulfacetamide Sodium-Sulfur 9.8-4.8 % LIQD Externally apply topically daily. (Patient not taking: Reported on 3/24/2025)       tretinoin (RETIN-A) 0.025 % external cream Use pea sized amount nightly to face. 45 g 3     Trifarotene (AKLIEF) 0.005 % CREA at bedtime. (Patient not taking: Reported on 3/24/2025)       Current Facility-Administered Medications   Medication Dose Route Frequency Provider Last Rate Last Admin     levonorgestrel (KYLEENA) 19.5 MG IUD 1 each  1 each Intrauterine Once           Past Medical History:    Patient Active Problem List   Diagnosis     Papanicolaou smear of cervix with low grade squamous intraepithelial lesion (LGSIL)     Past Medical History:   Diagnosis Date     ADHD (attention deficit hyperactivity disorder)        CC Referred Self, MD  No address on file on close of this encounter.     Again, thank you for allowing me to participate in the care of your patient.        Sincerely,        Kim Ray PA-C    Electronically signed

## 2025-08-06 PROBLEM — E53.8 DISORDER OF VITAMIN B12: Status: ACTIVE | Noted: 2023-12-19

## 2025-08-06 PROBLEM — L68.9 HYPERTRICHOSIS, UNSPECIFIED: Status: ACTIVE | Noted: 2023-12-19

## 2025-08-18 ENCOUNTER — OFFICE VISIT (OUTPATIENT)
Dept: DERMATOLOGY | Facility: CLINIC | Age: 25
End: 2025-08-18
Payer: COMMERCIAL

## 2025-08-18 DIAGNOSIS — L70.0 ACNE VULGARIS: Primary | ICD-10-CM

## 2025-08-18 PROCEDURE — 99213 OFFICE O/P EST LOW 20 MIN: CPT | Performed by: STUDENT IN AN ORGANIZED HEALTH CARE EDUCATION/TRAINING PROGRAM
